# Patient Record
Sex: MALE | Race: BLACK OR AFRICAN AMERICAN | Employment: OTHER | URBAN - METROPOLITAN AREA
[De-identification: names, ages, dates, MRNs, and addresses within clinical notes are randomized per-mention and may not be internally consistent; named-entity substitution may affect disease eponyms.]

---

## 2018-02-25 PROBLEM — R55 NEAR SYNCOPE: Status: ACTIVE | Noted: 2018-02-25

## 2018-02-25 PROBLEM — K92.2 GI BLEEDING: Status: ACTIVE | Noted: 2018-02-25

## 2018-02-25 PROBLEM — K92.2 GASTROINTESTINAL HEMORRHAGE: Status: ACTIVE | Noted: 2018-02-25

## 2018-09-24 ENCOUNTER — HOSPITAL ENCOUNTER (OUTPATIENT)
Dept: LAB | Age: 68
Discharge: HOME | End: 2018-09-24
Payer: MEDICARE

## 2018-09-24 ENCOUNTER — OFFICE VISIT (OUTPATIENT)
Dept: HEMATOLOGY | Age: 68
End: 2018-09-24

## 2018-09-24 VITALS
TEMPERATURE: 99 F | WEIGHT: 131 LBS | OXYGEN SATURATION: 98 % | DIASTOLIC BLOOD PRESSURE: 87 MMHG | HEART RATE: 104 BPM | RESPIRATION RATE: 16 BRPM | BODY MASS INDEX: 18.75 KG/M2 | HEIGHT: 70 IN | SYSTOLIC BLOOD PRESSURE: 143 MMHG

## 2018-09-24 DIAGNOSIS — R94.5 ABNORMAL RESULTS OF LIVER FUNCTION STUDIES: ICD-10-CM

## 2018-09-24 DIAGNOSIS — Z11.59 ENCOUNTER FOR SCREENING FOR OTHER VIRAL DISEASES: ICD-10-CM

## 2018-09-24 DIAGNOSIS — R74.8 ELEVATED LIVER ENZYMES: Primary | ICD-10-CM

## 2018-09-24 DIAGNOSIS — R74.8 ELEVATED LIVER ENZYMES: ICD-10-CM

## 2018-09-24 PROBLEM — Z90.49 S/P CHOLECYSTECTOMY: Status: ACTIVE | Noted: 2018-09-24

## 2018-09-24 PROBLEM — I10 HYPERTENSION: Status: ACTIVE | Noted: 2018-09-24

## 2018-09-24 PROBLEM — R79.89 ELEVATED FERRITIN: Status: ACTIVE | Noted: 2018-09-24

## 2018-09-24 PROBLEM — K92.2 GASTROINTESTINAL HEMORRHAGE: Status: RESOLVED | Noted: 2018-02-25 | Resolved: 2018-09-24

## 2018-09-24 PROBLEM — M10.9 GOUT: Status: ACTIVE | Noted: 2018-09-24

## 2018-09-24 PROBLEM — K57.90 DIVERTICULOSIS: Status: ACTIVE | Noted: 2018-09-24

## 2018-09-24 PROBLEM — R55 NEAR SYNCOPE: Status: RESOLVED | Noted: 2018-02-25 | Resolved: 2018-09-24

## 2018-09-24 LAB
ALBUMIN SERPL-MCNC: 4 G/DL (ref 3.4–5)
ALBUMIN/GLOB SERPL: 0.8 {RATIO} (ref 0.8–1.7)
ALP SERPL-CCNC: 86 U/L (ref 45–117)
ALT SERPL-CCNC: 44 U/L (ref 16–61)
ANION GAP SERPL CALC-SCNC: 10 MMOL/L (ref 3–18)
AST SERPL-CCNC: 120 U/L (ref 15–37)
BASOPHILS # BLD: 0 K/UL (ref 0–0.1)
BASOPHILS NFR BLD: 0 % (ref 0–2)
BILIRUB DIRECT SERPL-MCNC: 0.4 MG/DL (ref 0–0.2)
BILIRUB SERPL-MCNC: 1.2 MG/DL (ref 0.2–1)
BUN SERPL-MCNC: 30 MG/DL (ref 7–18)
BUN/CREAT SERPL: 22 (ref 12–20)
CALCIUM SERPL-MCNC: 9.5 MG/DL (ref 8.5–10.1)
CHLORIDE SERPL-SCNC: 97 MMOL/L (ref 100–108)
CO2 SERPL-SCNC: 29 MMOL/L (ref 21–32)
CREAT SERPL-MCNC: 1.37 MG/DL (ref 0.6–1.3)
DIFFERENTIAL METHOD BLD: ABNORMAL
EOSINOPHIL # BLD: 0 K/UL (ref 0–0.4)
EOSINOPHIL NFR BLD: 1 % (ref 0–5)
ERYTHROCYTE [DISTWIDTH] IN BLOOD BY AUTOMATED COUNT: 12.7 % (ref 11.6–14.5)
FERRITIN SERPL-MCNC: 1124 NG/ML (ref 8–388)
GLOBULIN SER CALC-MCNC: 5.2 G/DL (ref 2–4)
GLUCOSE SERPL-MCNC: 94 MG/DL (ref 74–99)
HCT VFR BLD AUTO: 37.1 % (ref 36–48)
HGB BLD-MCNC: 12.6 G/DL (ref 13–16)
INR PPP: 0.9 (ref 0.8–1.2)
IRON SATN MFR SERPL: 53 %
IRON SERPL-MCNC: 159 UG/DL (ref 50–175)
LYMPHOCYTES # BLD: 1.1 K/UL (ref 0.9–3.6)
LYMPHOCYTES NFR BLD: 20 % (ref 21–52)
MCH RBC QN AUTO: 34.8 PG (ref 24–34)
MCHC RBC AUTO-ENTMCNC: 34 G/DL (ref 31–37)
MCV RBC AUTO: 102.5 FL (ref 74–97)
MONOCYTES # BLD: 0.7 K/UL (ref 0.05–1.2)
MONOCYTES NFR BLD: 12 % (ref 3–10)
NEUTS SEG # BLD: 3.9 K/UL (ref 1.8–8)
NEUTS SEG NFR BLD: 67 % (ref 40–73)
PLATELET # BLD AUTO: 228 K/UL (ref 135–420)
PMV BLD AUTO: 10.3 FL (ref 9.2–11.8)
POTASSIUM SERPL-SCNC: 4.2 MMOL/L (ref 3.5–5.5)
PROT SERPL-MCNC: 9.2 G/DL (ref 6.4–8.2)
PROTHROMBIN TIME: 12.3 SEC (ref 11.5–15.2)
RBC # BLD AUTO: 3.62 M/UL (ref 4.7–5.5)
SODIUM SERPL-SCNC: 136 MMOL/L (ref 136–145)
TIBC SERPL-MCNC: 298 UG/DL (ref 250–450)
WBC # BLD AUTO: 5.8 K/UL (ref 4.6–13.2)

## 2018-09-24 PROCEDURE — 82728 ASSAY OF FERRITIN: CPT | Performed by: INTERNAL MEDICINE

## 2018-09-24 PROCEDURE — 86704 HEP B CORE ANTIBODY TOTAL: CPT | Performed by: INTERNAL MEDICINE

## 2018-09-24 PROCEDURE — 86706 HEP B SURFACE ANTIBODY: CPT | Performed by: INTERNAL MEDICINE

## 2018-09-24 PROCEDURE — 86708 HEPATITIS A ANTIBODY: CPT | Performed by: INTERNAL MEDICINE

## 2018-09-24 PROCEDURE — 83540 ASSAY OF IRON: CPT | Performed by: INTERNAL MEDICINE

## 2018-09-24 PROCEDURE — 82103 ALPHA-1-ANTITRYPSIN TOTAL: CPT | Performed by: INTERNAL MEDICINE

## 2018-09-24 PROCEDURE — 80048 BASIC METABOLIC PNL TOTAL CA: CPT | Performed by: INTERNAL MEDICINE

## 2018-09-24 PROCEDURE — 85610 PROTHROMBIN TIME: CPT | Performed by: INTERNAL MEDICINE

## 2018-09-24 PROCEDURE — 83516 IMMUNOASSAY NONANTIBODY: CPT | Performed by: INTERNAL MEDICINE

## 2018-09-24 PROCEDURE — 86038 ANTINUCLEAR ANTIBODIES: CPT | Performed by: INTERNAL MEDICINE

## 2018-09-24 PROCEDURE — 85025 COMPLETE CBC W/AUTO DIFF WBC: CPT | Performed by: INTERNAL MEDICINE

## 2018-09-24 PROCEDURE — 80076 HEPATIC FUNCTION PANEL: CPT | Performed by: INTERNAL MEDICINE

## 2018-09-24 PROCEDURE — 36415 COLL VENOUS BLD VENIPUNCTURE: CPT | Performed by: INTERNAL MEDICINE

## 2018-09-24 NOTE — PROGRESS NOTES
Raudel Linares is a 77 y.o. male 1. Have you been to the ER, urgent care clinic or hospitalized since your last visit? NO.  
 
2. Have you seen or consulted any other health care providers outside of the [address] [place] [address] since your last visit (Include any pap smears or colon screening)? NO Learning Assessment 9/24/2018 PRIMARY LEARNER Patient BARRIERS PRIMARY LEARNER NONE  
CO-LEARNER CAREGIVER No  
PRIMARY LANGUAGE ENGLISH  
LEARNER PREFERENCE PRIMARY LISTENING  
ANSWERED BY PATIENT  
RELATIONSHIP SELF

## 2018-09-24 NOTE — MR AVS SNAPSHOT
[address] [place] [address] 
[phone] Patient: Micheal Payer MRN: [id number] Xiao Underwood Visit Information Date & Time Provider Department Dept. Phone Encounter #  
 9/24/2018  3:30 PM [facility] [address] [id number] Follow-up Instructions Return in about 4 weeks (around 10/22/2018) for MLS. Upcoming Health Maintenance Date Due Hepatitis C Screening 1950 DTaP/Tdap/Td series (1 - Tdap) 8/14/1969 Shingrix Vaccine Age 50> (1 of 2) 8/14/1998 FOBT Q 1 YEAR AGE 50-75 8/14/1998 GLAUCOMA SCREENING Q2Y 8/14/2013 Pneumococcal 65+ Low/Medium Risk (1 of 2 - PCV13) 8/14/2013 MEDICARE YEARLY EXAM 3/20/2018 Influenza Age 5 to Adult 8/1/2018 Allergies as of 9/24/2018  Review Complete On: 9/24/2018 By: Ady Horn MD  
 No Known Allergies Current Immunizations  Reviewed on 3/1/2018 Name Date Influenza Vaccine 10/19/2016 Not reviewed this visit You Were Diagnosed With   
  
 Codes Comments Elevated liver enzymes    -  Primary ICD-10-CM: R74.8 ICD-9-CM: 790.5 Vitals BP Pulse Temp Resp Height(growth percentile) Weight(growth percentile) 143/87 (BP 1 Location: Right arm, BP Patient Position: Sitting) (!) 104 99 °F (37.2 °C) (Tympanic) 16 5' 10\" (1.778 m) 131 lb (59.4 kg) SpO2 BMI Smoking Status 98% 18.8 kg/m2 Never Smoker BMI and BSA Data Body Mass Index Body Surface Area  
 18.8 kg/m 2 1.71 m 2 Preferred Pharmacy Pharmacy Name Phone [facility] [address] [phone] Your Updated Medication List  
  
   
This list is accurate as of 9/24/18  4:40 PM.  Always use your most recent med list. amLODIPine 10 mg tablet Commonly known as:  Augustine Franco Take 10 mg by mouth daily. BYSTOLIC 20 mg tablet Generic drug:  nebivolol Take 20 mg by mouth daily. colchicine 0.6 mg tablet Take 0.6 mg by mouth daily. DIOVAN 320 mg tablet Generic drug:  valsartan Take 320 mg by mouth daily. multivitamin tablet Commonly known as:  ONE A DAY Take 1 Tab by mouth daily. omeprazole 20 mg capsule Commonly known as:  PRILOSEC Take 2 Caps by mouth daily. thiamine  mg tablet Commonly known as:  B-1 Take 100 mg by mouth daily. Follow-up Instructions Return in about 4 weeks (around 10/22/2018) for MLS. Introducing [facility]! [employer] introduces myVBO patient portal. Now you can access parts of your medical record, email your doctor's office, and request medication refills online. 1. In your internet browser, go to [email or web address] In [email or web address] K[employer] Kopo/Now In Store 2. Click on the First Time User? Click Here link in the Sign In box. You will see the New Member Sign Up page. 3. Enter your myVBO Access Code exactly as it appears below. You will not need to use this code after youve completed the sign-up process. If you do not sign up before the expiration date, you must request a new code. · myVBO Access Code: [email or web address] Expires: 12/23/2018  4:40 PM 
 
4. Enter the last four digits of your Social Security Number (xxxx) and Date of Birth (mm/dd/yyyy) as indicated and click Submit. You will be taken to the next sign-up page. 5. Create a Dynamic Signalt ID. This will be your myVBO login ID and cannot be changed, so think of one that is secure and easy to remember. 6. Create a myVBO password. You can change your password at any time. 7. Enter your Password Reset Question and Answer. This can be used at a later time if you forget your password. 8. Enter your e-mail address. You will receive e-mail notification when new information is available in [address] 9. Click Sign Up. You can now view and download portions of your medical record. 10. Click the Download Summary menu link to download a portable copy of your medical information. If you have questions, please visit the Frequently Asked Questions section of the Marquee website. Remember, Marquee is NOT to be used for urgent needs. For medical emergencies, dial 911. Now available from your iPhone and Android! Please provide this summary of care documentation to your next provider. Your primary care clinician is listed as Celia Putnam. If you have any questions after today's visit, please call [phone].

## 2018-09-24 NOTE — PROGRESS NOTES
[address] [facility] [address] Terry Walton MD, Esperanza Wilson, TRUPTISMATTHEW Avelar, CARINA Mendez, MARIAM Partida, [facility]   Alondra Reyez, CARINA Romero, CARINA Liver [place] [facility] 
  [address] [facility], [address] Terrance Jerez  22. 
  [phone] FAX: [phone]   Liver [facility] 
  [address], [address] [facility], [address] 
  [phone] FAX: [phone] Patient Care Team: 
Corinna Yeboah MD as PCP - [facility]) Ciro Tucker MD (Family Practice) Problem List  Date Reviewed: 9/24/2018 Codes Class Noted Elevated liver enzymes ICD-10-CM: R74.8 ICD-9-CM: 790.5  9/24/2018 Elevated ferritin ICD-10-CM: R79.89 ICD-9-CM: 790.6  9/24/2018 Diverticulosis ICD-10-CM: K57.90 ICD-9-CM: 562.10  9/24/2018 Hypertension ICD-10-CM: I10 
ICD-9-CM: 401.9  9/24/2018 Gout ICD-10-CM: M10.9 ICD-9-CM: 274.9  9/24/2018 S/P cholecystectomy ICD-10-CM: Z90.49 ICD-9-CM: V45.79  9/24/2018 GI bleeding ICD-10-CM: K92.2 ICD-9-CM: 578.9  2/25/2018 The clinicians listed above have asked me to see Nellie Félix in consultation regarding elevated liver enzymes and its management. All medical records sent by the referring physicians were reviewed The patient is a 77 y.o. Black male who was first noted to have abnormalities in liver transaminases in 5/2018. Serologic evaluation for markers of chronic liver disease was negative for HCV, HBV. The Ferritin was markedly elevated. He was hospitalized for lower GI bleeding in 3/2018. THis was thought to be secondary to diverticulosis Imaging of the liver was not performed. An assessment of liver fibrosis with biopsy or elastography has not been performed. The patient had not started any new medications within 3 months preceding the elevation in liver chemistries. The patient has no symptoms which can be attributed to the liver disorder. The patient has not experienced fatigue, pain in the right side over the liver, The patient completes all daily activities without any functional limitations. All of the issues listed in the Assessment and Plan were discussed with the patient. All questions were answered. The patient expressed a clear understanding of the above. [facility] [place] [address] in 4 weeks for Fibroscan to review all data and determine the treatment plan. ASSESSMENT AND PLAN: 
Intermittent elevation in liver transaminases of unclear etiology at this time. AST is elevated. ALT is normal.  ALP is normal.  Liver function is normal.  Total bilirubin is elevated. The platelet count is normal.   
Based upon laboratory studies and imaging  the patient may have significant liver injury. Serologic testing for causes of chronic liver disease were positive for ASMA, Ferritin, FE saturation The most likely causes for the liver chemistry abnormalities were discussed with the patient and include fatty liver disease, immune liver disorders, Will perform laboratory testing to monitor liver function and degree of liver injury. This included BMP, hepatic panel, CBC with platelet count, Will perform imaging of the liver with ultrasound. The need to perform a liver biopsy to help determine the cause and severity of the liver test abnormalities was discussed. The risks of performing the liver biopsy including pain, puncture of the lung, gallbladder, intestine or kidney and bleeding were discussed. Will defer liver biopsy for now. The need to assess liver fibrosis was discussed.   Sheer wave elastography can assess liver fibrosis and determine if a patient has advanced fibrosis or cirrhosis without the need for liver biopsy. Sheer wave elastography is now available at [facility]. This will be scheduled. If elastrography suggests advanced fibrosis then a liver biopsy should be considered. Elevation in Ferritin There is an elevation in ferritin with normal iron saturation. It is unlikely that the patient has hemochromatosis or is a carrier for an HFE gene. Will order HFE genetic testing. The elevation in ferritin may reflect hepatic inflammation. Chronic kidney disease This is probably from of unclear etiology. The serum creatinine appears to be stable. NSAIDs should not be utilized as this may worsen CKD. Treatment of other medical problems in patients with chronic liver disease There are no contraindications for the patient to take any medications that are necessary for treatment of other medical issues. The patient does not consume alcohol daily. Normal doses of acetaminophen can be used for pain as needed. Normal doses of acetaminophen as recommended on the label are not hepatotoxic, even in patients with cirrhosis. Counseling for alcohol in patients with chronic liver disease The patient was counseled regarding alcohol consumption and the effect of alcohol on chronic liver disease. The patient does not consume any significant amount of alcohol. Vaccinations Vaccination for viral hepatitis A is recommended since the patient has no serologic evidence of previous exposure or vaccination with immunity. Vaccination for viral hepatitis B is not needed. The patient has serologic evidence of prior exposure or vaccination with immunity. Routine vaccinations against other bacterial and viral agents can be performed as indicated. Annual flu vaccination should be administered if indicated. Screening [facility]. screening is not necessary if the patient has no evidence of cirrhosis. ALLERGIES No Known Allergies MEDICATIONS Current Outpatient Prescriptions Medication Sig  
 amLODIPine (NORVASC) 10 mg tablet Take 10 mg by mouth daily.  colchicine 0.6 mg tablet Take 0.6 mg by mouth daily.  nebivolol (BYSTOLIC) 20 mg tablet Take 20 mg by mouth daily.  multivitamin (ONE A DAY) tablet Take 1 Tab by mouth daily.  thiamine (B-1) 100 mg tablet Take 100 mg by mouth daily.  valsartan (DIOVAN) 320 mg tablet Take 320 mg by mouth daily.  omeprazole (PRILOSEC) 20 mg capsule Take 2 Caps by mouth daily. No current facility-administered medications for this visit. SYSTEM REVIEW NOT RELATED TO LIVER DISEASE OR REVIEWED ABOVE: 
Constitution systems: Negative for fever, chills, weight gain, weight loss. Eyes: Negative for visual changes. ENT: Negative for sore throat, painful swallowing. Respiratory: Negative for cough, hemoptysis, SOB. Cardiology: Negative for chest pain, palpitations. GI:  Negative for constipation or diarrhea. : Negative for urinary frequency, dysuria, hematuria, nocturia. Skin: Negative for rash. Hematology: Negative for easy bruising, blood clots. Musculo-skelatal: Negative for back pain, muscle pain, weakness. Neurologic: Negative for headaches, dizziness, vertigo, memory problems not related to HE. Psychology: Negative for anxiety, depression. FAMILY HISTORY: 
The patient has no knowledge of the father's medical condition. The mother is  of CVA. There is no family history of liver disease. SOCIAL HISTORY: 
The patient is . The patient has 4 children, and 3 grandchildren. The patient has never used tobacco products. The patient consumes 1-2 alcoholic beverages per day. The patient used to work in [address] [employer] in [place]. The patient retired in . PHYSICAL EXAMINATION: 
Visit Vitals  /87 (BP 1 Location: Right arm, BP Patient Position: Sitting)  Pulse (!) 104  Temp 99 °F (37.2 °C) (Tympanic)  Resp 16  
 Ht 5' 10\" (1.778 m)  Wt 131 lb (59.4 kg)  SpO2 98%  BMI 18.8 kg/m2 General: No acute distress. Eyes: Sclera anicteric. ENT: No oral lesions. Thyroid normal. 
Nodes: No adenopathy. Skin: No spider angiomata. No jaundice. No palmar erythema. Respiratory: Lungs clear to auscultation. Cardiovascular: Regular heart rate. No murmurs. No JVD. Abdomen: Soft non-tender. Liver size normal to percussion/palpation. Spleen not palpable. No obvious ascites. Extremities: No edema. No muscle wasting. No gross arthritic changes. Neurologic: Alert and oriented. Cranial nerves grossly intact. No asterixis. LABORATORY STUDIES: 
Liver [place] of [address] [place] [address] & Units 9/24/2018 WBC 4.6 - 13.2 K/uL 5.8 ANC 1.8 - 8.0 K/UL 3.9 HGB 13.0 - 16.0 g/dL 12.6 (L)  - 420 K/uL 228 INR 0.8 - 1.2   0.9 AST 15 - 37 U/L 120 (H) ALT 16 - 61 U/L 44 Alk Phos 45 - 117 U/L 86 Bili, Total 0.2 - 1.0 MG/DL 1.2 (H) Bili, Direct 0.0 - 0.2 MG/DL 0.4 (H) Albumin 3.4 - 5.0 g/dL 4.0  
BUN 7.0 - 18 MG/DL 30 (H) Creat 0.6 - 1.3 MG/DL 1.37 (H) Na 136 - 145 mmol/L 136  
K 3.5 - 5.5 mmol/L 4.2 Cl 100 - 108 mmol/L 97 (L)  
CO2 21 - 32 mmol/L 29 Glucose 74 - 99 mg/dL 94 SEROLOGIES: 
6/2018. HBsurface antigen negative, anti-HCV negative, Ferritin 1388, FE saturation 49%, DOROTHEA negative, ASMA negative Serologies Latest Ref Rng & Units 9/24/2018 Hep A Ab, Total NEGATIVE   NEGATIVE Hep B Core Ab, Total NEGATIVE   NEGATIVE Hep B Surface Ab >10.0 mIU/mL 417.62 Hep B Surface Ab Interp POS   POSITIVE Ferritin 8 - 388 NG/ML 1124 (H) Iron % Saturation % 53 DOROTHEA, IFA  NEGATIVE  
ASMCA 0 - 19 Units 22 (H) Alpha-1 antitrypsin level 90 - 200 mg/dL 202 (H) LIVER HISTOLOGY: 
Not available or performed ENDOSCOPIC PROCEDURES: 
 Not available or performed RADIOLOGY: 
8/2015. Ultrasound of liver. Echogenic consistent with fatty liver. No liver mass lesions. No dilated bile ducts. No ascites. OTHER TESTING: 
Not available or performed Ryne Kramer MD 
[id number] [address], [address]  Antonioarron Goetz Kylie, [facility] [address] 
[phone]  [address] [place] [address]

## 2018-09-25 LAB
A1AT SERPL-MCNC: 202 MG/DL (ref 90–200)
ACTIN IGG SERPL-ACNC: 22 UNITS (ref 0–19)
ANA TITR SER IF: NEGATIVE {TITER}
HAV AB SER QL IA: NEGATIVE
HBV CORE AB SERPL QL IA: NEGATIVE
HBV SURFACE AB SER QL IA: POSITIVE
HBV SURFACE AB SERPL IA-ACNC: 417.62 MIU/ML
HEP BS AB COMMENT,HBSAC: NORMAL

## 2018-10-29 ENCOUNTER — OFFICE VISIT (OUTPATIENT)
Dept: HEMATOLOGY | Age: 68
End: 2018-10-29

## 2018-10-29 ENCOUNTER — HOSPITAL ENCOUNTER (OUTPATIENT)
Dept: ULTRASOUND IMAGING | Age: 68
Discharge: HOME | End: 2018-10-29
Attending: INTERNAL MEDICINE
Payer: MEDICARE

## 2018-10-29 VITALS
BODY MASS INDEX: 19.58 KG/M2 | HEIGHT: 70 IN | RESPIRATION RATE: 16 BRPM | DIASTOLIC BLOOD PRESSURE: 101 MMHG | HEART RATE: 90 BPM | SYSTOLIC BLOOD PRESSURE: 178 MMHG | OXYGEN SATURATION: 97 % | TEMPERATURE: 98.3 F | WEIGHT: 136.8 LBS

## 2018-10-29 DIAGNOSIS — R74.8 ELEVATED LIVER ENZYMES: Primary | ICD-10-CM

## 2018-10-29 DIAGNOSIS — R74.8 ELEVATED LIVER ENZYMES: ICD-10-CM

## 2018-10-29 PROCEDURE — 0346T US ABD LTD W ELASTOGRAPHY: CPT

## 2018-10-29 NOTE — PROGRESS NOTES
Enedina Love MD, FACP, [place] [place] [place], [place]       Myriam Manzo, MARIAM Pang ACNP-BC   Kendy Goyal, CARINA Siddiqui 75    at [address], [address] [place] [address], [facility] 22.    [phone]    FAX: [email or web address]    at [address], [address]    [phone]    FAX: [phone]         Patient Care Team:  Pablo Vance MD as PCP - General (Family Practice)  Smitha Moreno MD (Family Practice)      Problem List  Date Reviewed: 9/26/2018          Codes Class Noted    Elevated liver enzymes ICD-10-CM: R74.8  ICD-9-CM: 790.5  9/24/2018        Elevated ferritin ICD-10-CM: R79.89  ICD-9-CM: 790.6  9/24/2018        Diverticulosis ICD-10-CM: K57.90  ICD-9-CM: 562.10  9/24/2018        Hypertension ICD-10-CM: I10  ICD-9-CM: 401.9  9/24/2018        Gout ICD-10-CM: M10.9  ICD-9-CM: 274.9  9/24/2018        S/P cholecystectomy ICD-10-CM: Z90.49  ICD-9-CM: V45.79  9/24/2018        GI bleeding ICD-10-CM: K92.2  ICD-9-CM: 578.9  2/25/2018              Alexander Henriquez returns to the [address] for management of elevated liver enzymes and ferritin. The active problem list, all pertinent past medical history, medications, radiologic findings and laboratory findings related to the liver disorder were reviewed with the patient. The patient is a 77 y.o. Black male who was noted to have abnormalities in liver transaminases in 5/2018. Serologic evaluation for markers of chronic liver disease was negative     The most recent imaging of the liver was Ultrasound performed in 10/2018. Results suggest chronic liver disease or cirrhosis. No liver mass lesions noted.     Assessment of liver fibrosis was performed with sheer wave elastogrphy at [facility] in 10/2018. The result was 7.9 kPa which correlates stage 2 septal fibrosis    The patient has no symptoms which can be attributed to the liver disorder. The patient has not experienced fatigue, pain in the right side over the liver,     The patient completes all daily activities without any functional limitations. ASSESSMENT AND PLAN:  Intermittent elevation in liver transaminases of unclear etiology at this time. AST is elevated. ALT is normal.  ALP is normal.  Liver function is normal.  Total bilirubin is elevated. The platelet count is normal.    Based upon laboratory studies and imaging  the patient may have significant liver injury. Serologic testing for causes of chronic liver disease were positive for ASMA, Ferritin, FE saturation    The most likely causes for the liver chemistry abnormalities were discussed with the patient and include fatty liver disease, immune liver disorders,     The need to perform a liver biopsy to help determine the cause and severity of the liver test abnormalities was discussed. The risks of performing the liver biopsy including pain, puncture of the lung, gallbladder, intestine or kidney and bleeding were discussed. Will proceed with liver biopsy. This will be scheduled. Elevation in Ferritin  There is an elevation in ferritin with normal iron saturation. It is unlikely that the patient has hemochromatosis or is a carrier for an HFE gene. Will order HFE genetic testing. The elevation in ferritin may reflect hepatic inflammation. Chronic kidney disease  This is probably from of unclear etiology. The serum creatinine appears to be stable. NSAIDs should not be utilized as this may worsen CKD.       Treatment of other medical problems in patients with chronic liver disease  There are no contraindications for the patient to take any medications that are necessary for treatment of other medical issues. The patient does not consume alcohol daily. Normal doses of acetaminophen can be used for pain as needed. Normal doses of acetaminophen as recommended on the label are not hepatotoxic, even in patients with cirrhosis. Counseling for alcohol in patients with chronic liver disease  The patient was counseled regarding alcohol consumption and the effect of alcohol on chronic liver disease. The patient does not consume any significant amount of alcohol. Vaccinations   Vaccination for viral hepatitis A is recommended since the patient has no serologic evidence of previous exposure or vaccination with immunity. Vaccination for viral hepatitis B is not needed. The patient has serologic evidence of prior exposure or vaccination with immunity. Routine vaccinations against other bacterial and viral agents can be performed as indicated. Annual flu vaccination should be administered if indicated. Screening for Hepatocellular Carcinoma  HCC screening is not necessary if the patient has no evidence of cirrhosis. ALLERGIES  No Known Allergies    MEDICATIONS  Current Outpatient Medications   Medication Sig    omeprazole (PRILOSEC) 20 mg capsule Take 2 Caps by mouth daily.  amLODIPine (NORVASC) 10 mg tablet Take 10 mg by mouth daily.  colchicine 0.6 mg tablet Take 0.6 mg by mouth daily.  nebivolol (BYSTOLIC) 20 mg tablet Take 20 mg by mouth daily.  multivitamin (ONE A DAY) tablet Take 1 Tab by mouth daily.  thiamine (B-1) 100 mg tablet Take 100 mg by mouth daily.  valsartan (DIOVAN) 320 mg tablet Take 320 mg by mouth daily. No current facility-administered medications for this visit. SYSTEM REVIEW NOT RELATED TO LIVER DISEASE OR REVIEWED ABOVE:  Constitution systems: Negative for fever, chills, weight gain, weight loss. Eyes: Negative for visual changes. ENT: Negative for sore throat, painful swallowing. Respiratory: Negative for cough, hemoptysis, SOB. Cardiology: Negative for chest pain, palpitations. GI:  Negative for constipation or diarrhea. : Negative for urinary frequency, dysuria, hematuria, nocturia. Skin: Negative for rash. Hematology: Negative for easy bruising, blood clots. Musculo-skelatal: Negative for back pain, muscle pain, weakness. Neurologic: Negative for headaches, dizziness, vertigo, memory problems not related to HE. Psychology: Negative for anxiety, depression. FAMILY HISTORY:  The patient has no knowledge of the father's medical condition. The mother is  of CVA. There is no family history of liver disease. SOCIAL HISTORY:  The patient is . The patient has 4 children, and 3 grandchildren. The patient has never used tobacco products. The patient consumes 1-2 alcoholic beverages per day. The patient used to work in [employer] in [place]. The patient retired in . PHYSICAL EXAMINATION:  Visit Vitals  BP (!) 178/101 (BP 1 Location: Left arm, BP Patient Position: Sitting)   Pulse 90   Temp 98.3 °F (36.8 °C) (Tympanic)   Resp 16   Ht 5' 10\" (1.778 m)   Wt 136 lb 12.8 oz (62.1 kg)   SpO2 97%   BMI 19.63 kg/m²     General: No acute distress. Eyes: Sclera anicteric. ENT: No oral lesions. Thyroid normal.  Nodes: No adenopathy. Skin: No spider angiomata. No jaundice. No palmar erythema. Respiratory: Lungs clear to auscultation. Cardiovascular: Regular heart rate. No murmurs. No JVD. Abdomen: Soft non-tender. Liver size normal to percussion/palpation. Spleen not palpable. No obvious ascites. Extremities: No edema. No muscle wasting. No gross arthritic changes. Neurologic: Alert and oriented. Cranial nerves grossly intact. No asterixis.     LABORATORY STUDIES:  Liver [place] of [address] Units 2018   WBC 4.6 - 13.2 K/uL 5.8   ANC 1.8 - 8.0 K/UL 3.9   HGB 13.0 - 16.0 g/dL 12.6 (L)    - 420 K/uL 228   INR 0.8 - 1.2   0.9   AST 15 - 37 U/L 120 (H)   ALT 16 - 61 U/L 44   Alk Phos 45 - 117 U/L 86   Bili, Total 0.2 - 1.0 MG/DL 1.2 (H)   Bili, Direct 0.0 - 0.2 MG/DL 0.4 (H)   Albumin 3.4 - 5.0 g/dL 4.0   BUN 7.0 - 18 MG/DL 30 (H)   Creat 0.6 - 1.3 MG/DL 1.37 (H)   Na 136 - 145 mmol/L 136   K 3.5 - 5.5 mmol/L 4.2   Cl 100 - 108 mmol/L 97 (L)   CO2 21 - 32 mmol/L 29   Glucose 74 - 99 mg/dL 94     SEROLOGIES:  6/2018. HBsurface antigen negative, anti-HCV negative, Ferritin 1388, FE saturation 49%, DOROTHEA negative, ASMA negative    Serologies Latest Ref Rng & Units 9/24/2018   Hep A Ab, Total NEGATIVE   NEGATIVE   Hep B Core Ab, Total NEGATIVE   NEGATIVE   Hep B Surface Ab >10.0 mIU/mL 417.62   Hep B Surface Ab Interp POS   POSITIVE   Ferritin 8 - 388 NG/ML 1124 (H)   Iron % Saturation % 53   DOROTHEA, IFA  NEGATIVE   ASMCA 0 - 19 Units 22 (H)   Alpha-1 antitrypsin level 90 - 200 mg/dL 202 (H)     LIVER HISTOLOGY:  10/2018. Sheer wave elastography performed at [facility]. E Range: 6.37-9.48 kPa, E Mean: 7.86 kPa, E Median: 7.94 kPa, E Std: 1.00 kPa. The results suggested a fibrosis level of F2.    ENDOSCOPIC PROCEDURES:  Not available or performed    RADIOLOGY:  8/2015. Ultrasound of liver. Echogenic consistent with fatty liver. No liver mass lesions. No dilated bile ducts. No ascites.     OTHER TESTING:  Not available or performed      Leni Ross MD  [address] [address]  4 [facility] 58, [address]  [address]

## 2018-11-20 ENCOUNTER — TELEPHONE (OUTPATIENT)
Dept: HEMATOLOGY | Age: 68
End: 2018-11-20

## 2018-11-20 NOTE — TELEPHONE ENCOUNTER
11/19/18 - Called pt to inform him that his lbx procedure for 11/28/18 had to be cxld and r/s . Isabella Dangelo No answer    11/20/18 - called pt again to infrom him that his lbx procedure needed to be cxld and r/s . Isabella Dangelo  No answer, left vm to return call

## 2018-11-21 ENCOUNTER — TELEPHONE (OUTPATIENT)
Dept: HEMATOLOGY | Age: 68
End: 2018-11-21

## 2018-11-21 NOTE — TELEPHONE ENCOUNTER
Last attempt to contact pt to r/s procedure for 11/28/18 , Dr Sheldon Werner request , had an opening for 11/26/18 where I could move patient to . Robert Cadena  No answer left vm for pt to return call

## 2018-11-28 ENCOUNTER — HOSPITAL ENCOUNTER (OUTPATIENT)
Age: 68
Setting detail: OUTPATIENT SURGERY
Discharge: HOME | End: 2018-11-28
Attending: INTERNAL MEDICINE | Admitting: INTERNAL MEDICINE
Payer: MEDICARE

## 2018-11-28 ENCOUNTER — APPOINTMENT (OUTPATIENT)
Dept: ULTRASOUND IMAGING | Age: 68
End: 2018-11-28
Attending: INTERNAL MEDICINE
Payer: MEDICARE

## 2018-11-28 VITALS
SYSTOLIC BLOOD PRESSURE: 152 MMHG | HEIGHT: 70 IN | RESPIRATION RATE: 15 BRPM | OXYGEN SATURATION: 97 % | BODY MASS INDEX: 19.35 KG/M2 | HEART RATE: 81 BPM | TEMPERATURE: 98.6 F | DIASTOLIC BLOOD PRESSURE: 87 MMHG | WEIGHT: 135.19 LBS

## 2018-11-28 DIAGNOSIS — R79.89 ELEVATED LFTS: ICD-10-CM

## 2018-11-28 PROCEDURE — 76040000019: Performed by: INTERNAL MEDICINE

## 2018-11-28 PROCEDURE — 77030018836 HC SOL IRR NACL ICUM -A: Performed by: INTERNAL MEDICINE

## 2018-11-28 PROCEDURE — 74011250636 HC RX REV CODE- 250/636: Performed by: INTERNAL MEDICINE

## 2018-11-28 PROCEDURE — 88307 TISSUE EXAM BY PATHOLOGIST: CPT

## 2018-11-28 PROCEDURE — 88313 SPECIAL STAINS GROUP 2: CPT

## 2018-11-28 PROCEDURE — 76942 ECHO GUIDE FOR BIOPSY: CPT

## 2018-11-28 PROCEDURE — 77030013826 HC NDL BIOP MAXCOR BARD -B: Performed by: INTERNAL MEDICINE

## 2018-11-28 RX ORDER — SODIUM CHLORIDE 0.9 % (FLUSH) 0.9 %
5-10 SYRINGE (ML) INJECTION EVERY 8 HOURS
Status: DISCONTINUED | OUTPATIENT
Start: 2018-11-28 | End: 2018-11-28 | Stop reason: HOSPADM

## 2018-11-28 RX ORDER — ONDANSETRON 2 MG/ML
4 INJECTION INTRAMUSCULAR; INTRAVENOUS
Status: CANCELLED | OUTPATIENT
Start: 2018-11-28

## 2018-11-28 RX ORDER — LIDOCAINE HYDROCHLORIDE 10 MG/ML
10 INJECTION INFILTRATION; PERINEURAL ONCE
Status: CANCELLED | OUTPATIENT
Start: 2018-11-28 | End: 2018-11-28

## 2018-11-28 RX ORDER — HYDROMORPHONE HYDROCHLORIDE 1 MG/ML
1 INJECTION, SOLUTION INTRAMUSCULAR; INTRAVENOUS; SUBCUTANEOUS
Status: CANCELLED | OUTPATIENT
Start: 2018-11-28

## 2018-11-28 RX ORDER — SODIUM CHLORIDE 0.9 % (FLUSH) 0.9 %
5-10 SYRINGE (ML) INJECTION AS NEEDED
Status: DISCONTINUED | OUTPATIENT
Start: 2018-11-28 | End: 2018-11-28 | Stop reason: HOSPADM

## 2018-11-28 RX ORDER — HYDROMORPHONE HYDROCHLORIDE 2 MG/ML
1 INJECTION, SOLUTION INTRAMUSCULAR; INTRAVENOUS; SUBCUTANEOUS ONCE
Status: COMPLETED | OUTPATIENT
Start: 2018-11-28 | End: 2018-11-28

## 2018-11-28 RX ORDER — LIDOCAINE HYDROCHLORIDE 10 MG/ML
INJECTION INFILTRATION; PERINEURAL AS NEEDED
Status: DISCONTINUED | OUTPATIENT
Start: 2018-11-28 | End: 2018-11-28 | Stop reason: HOSPADM

## 2018-11-28 RX ADMIN — HYDROMORPHONE HYDROCHLORIDE 1 MG: 2 INJECTION INTRAMUSCULAR; INTRAVENOUS; SUBCUTANEOUS at 09:43

## 2018-11-28 NOTE — PROCEDURES
[address], [place], [place], Sunita Gulshan Gina, [place]       CARINA Null PA-C Edilia Rudd, [facility]CARINA Wakefield NP [address] [facility] 136    at [address], [address] Terrance Jerez  22.    [phone]    FAX: [address] [place] [address], [address]    [phone]    FAX: [phone]         LIVER BIOPSY PROCEDURE NOTE    Corona Michaels  5/60/2084    INDICATIONS/PRE-OPERATIVE  DIAGNOSIS:  Elevated liver enzymes    : Maggy Lutz MD    SEDATION: 1% Lidocaine injection 10 ml    PROCEDURE:  Informed consent to perform the procedure was obtained from the patient. The patient was positioned on the edge of the stretcher lying flat in the supine position. Ultrasound was utilized to image the liver. The diaphragm and any major mass lesion or vascular structures within the liver were identified. An appropriate site for liver biopsy was identified. The distance from the surface of the skin to the liver capsule was 3 cm. This area was prepped with betadine and draped in sterile fashion. The skin was infiltrated with 1% lidocaine. The deeper subcutanous tissues and liver capsule overlying the biopsy site were then infiltrated with 1% lidocaine until appropriate anesthesia was obtained. A small incision was made in the skin so the biopsy devise could be easily inserted. A total of 3 passes with the 18 gauge Bard biopsy devise was then made into the liver. Core(s) of liver tissue totaling 4 cm in length were obtained and placed into tissue fixative. A band aid was placed over the biopsy site.   The patient was then repositioned on the right side and transported to the recovery area on the stretcher for routine monitoring until discharge. The specimen was sent to pathology for processing via the normal transport mechanism. SPECIMEN REMOVED: Liver    ESTIMATED BLOOD LOSS: Negligible.       POST-OPERATIVE DIAGNOSIS: Same as Pre-operative Diagnosis    Darlene Lopez MD  [address]  [address] [place] [address] 58, 300 [facility] [address]  [address] [place] [address]

## 2018-11-28 NOTE — DISCHARGE INSTRUCTIONS
3340 [facility], MD, [address], [facility], [place]       CARINA Brambila PA-C Barrett Dyers, [facility]-BC   Soco Parr, CARINA Longoria [facility] 136    at [address], [address] Terrance Jerez  22.    [phone]    FAX: [address] [place] [address]    [address], [address] [facility], [address]    [phone]    FAX: [phone]         LIVER BIOPSY DISCHARGE INSTRUCTIONS      Clair Quintanilla  [birth year]  Date: 11/28/2018    DIET:    Marilu Langford may resume your previous diet. ACTIVITIES:  Rest quietly the rest of today. You should not lift any objects more than 20 pounds for the next 2 days. If you work sitting down without strenuous activity you may return to work tomorrow. If you exert yourself or do heavy lifting at work you should take tomorrow off. Do not drive or operate hazardous machinery for 12 hours. SPECIAL INSTRUCTIONS:  Do not use any aspirin or non-steroidal (Motin, Advil, Naproxen, etc) pain medications for the next 3 days. You may use extra-strength Tylenol (acetaminophen) if you experience pain or discomfort later today. Call the [address] office if you experience any of the following:  Persistent or severe abdominal pain. Persistent or severe abdominal distention. Fever and chills   Nausea and vomiting. New or unusual symptoms. Follow-up care: You should have a follow up appointment with Dr. Ana Del Real to review the results of the liver biopsy results in 2 weeks. If you do not have an appointment please call the office at the number listed above to schedule this. Other instructions:    If you have any problems or questions call the [facility] office at the phone number listed above.    Donte Aaron from Nurse: The following personal items collected during your admission are returned to you:   Dental Appliance: Dental Appliances: None  Vision:    Hearing Aid:    Jewelry:    Clothing:    Other Valuables:    Valuables sent to safe:       DISCHARGE SUMMARY from Nurse    PATIENT INSTRUCTIONS:    After general anesthesia or intravenous sedation, for 24 hours or while taking prescription Narcotics:  · Limit your activities  · Do not drive and operate hazardous machinery  · Do not make important personal or business decisions  · Do  not drink alcoholic beverages  · If you have not urinated within 8 hours after discharge, please contact your surgeon on call. Report the following to your surgeon:  · Excessive pain, swelling, redness or odor of or around the surgical area  · Temperature over 100.5  · Nausea and vomiting lasting longer than 4 hours or if unable to take medications  · Any signs of decreased circulation or nerve impairment to extremity: change in color, persistent  numbness, tingling, coldness or increase pain  · Any questions    *  Please give a list of your current medications to your Primary Care Provider. *  Please update this list whenever your medications are discontinued, doses are      changed, or new medications (including over-the-counter products) are added. *  Please carry medication information at all times in case of emergency situations. These are general instructions for a healthy lifestyle:    No smoking/ No tobacco products/ Avoid exposure to second hand smoke  Surgeon General's Warning:  Quitting smoking now greatly reduces serious risk to your health.     Obesity, smoking, and sedentary lifestyle greatly increases your risk for illness    A healthy diet, regular physical exercise & weight monitoring are important for maintaining a healthy lifestyle    You may be retaining fluid if you have a history of heart failure or if you experience any of the following symptoms:  Weight gain of 3 pounds or more overnight or 5 pounds in a week, increased swelling in our hands or feet or shortness of breath while lying flat in bed. Please call your doctor as soon as you notice any of these symptoms; do not wait until your next office visit. Recognize signs and symptoms of STROKE:    F-face looks uneven    A-arms unable to move or move unevenly    S-speech slurred or non-existent    T-time-call 911 as soon as signs and symptoms begin-DO NOT go       Back to bed or wait to see if you get better-TIME IS BRAIN. Warning Signs of HEART ATTACK     Call 911 if you have these symptoms:   Chest discomfort. Most heart attacks involve discomfort in the center of the chest that lasts more than a few minutes, or that goes away and comes back. It can feel like uncomfortable pressure, squeezing, fullness, or pain.  Discomfort in other areas of the upper body. Symptoms can include pain or discomfort in one or both arms, the back, neck, jaw, or stomach.  Shortness of breath with or without chest discomfort.  Other signs may include breaking out in a cold sweat, nausea, or lightheadedness. Don't wait more than five minutes to call 911 - MINUTES MATTER! Fast action can save your life. Calling 911 is almost always the fastest way to get lifesaving treatment. Emergency Medical Services staff can begin treatment when they arrive -- up to an hour sooner than if someone gets to the hospital by car. The discharge information has been reviewed with the patient and caregiver. The patient and caregiver verbalized understanding. Discharge medications reviewed with the patient and caregiver and appropriate educational materials and side effects teaching were provided. Patient armband removed and shredded  ___________________________________________________________________________________________________________________________________

## 2018-11-28 NOTE — H&P
[address] Emmy Sonia August MD, Maty Stewart, TRUPTISLD Cedrick Underwood, MARIAM Mark, [facility]-BC   Luiz Gonzalez, CARINA Huerta, CARINA Dorantes [facility] 136 
  at [address], [address] Terrance Jerez  22. 
  [phone] FAX: [address] 
  [facility] 
  [address], [address] [facility], [address] 
  [phone] FAX: [phone] PRE-PROCEDURE NOTE - LIVER BIOPSY H and P from last office visit reviewed. Allergies reviewed. Out-patient medication list reviewed. Patient Active Problem List  
Diagnosis Code  GI bleeding K92.2  Elevated liver enzymes R74.8  Elevated ferritin R79.89  Diverticulosis K57.90  Hypertension I10  
 Gout M10.9  S/P cholecystectomy Z90.49 No Known Allergies No current facility-administered medications on file prior to encounter. Current Outpatient Medications on File Prior to Encounter Medication Sig Dispense Refill  omeprazole (PRILOSEC) 20 mg capsule Take 2 Caps by mouth daily. (Patient taking differently: Take 20 mg by mouth daily as needed.) 30 Cap 0  
 amLODIPine (NORVASC) 10 mg tablet Take 10 mg by mouth daily.  colchicine 0.6 mg tablet Take 0.6 mg by mouth as needed.  nebivolol (BYSTOLIC) 20 mg tablet Take 20 mg by mouth daily.  valsartan (DIOVAN) 320 mg tablet Take 320 mg by mouth daily.  multivitamin (ONE A DAY) tablet Take 1 Tab by mouth daily.  thiamine (B-1) 100 mg tablet Take 100 mg by mouth daily. For liver biopsy to assess Abnormal liver enzymes. The risks of the procedure were discussed with the patient. This included bleeding, pain, and puncture of other organs.   All questions were answered. The patient wishes to proceed with the procedure. PHYSICAL EXAMINATION: 
VS: per nursing note General: No acute distress. Eyes: Sclera anicteric. ENT: No oral lesions. Thyroid normal. 
Nodes: No adenopathy. Skin: No spider angiomata. No jaundice. No palmar erythema. Respiratory: Lungs clear to auscultation. Cardiovascular: Regular heart rate. No murmurs. No JVD. Abdomen: Soft non-tender, liver size normal to percussion/palpation. Spleen not palpable. No obvious ascites. Extremities: No edema. No muscle wasting. No gross arthritic changes. Neurologic: Alert and oriented. Cranial nerves grossly intact. No asterixis. LABS: 
Lab Results Component Value Date/Time WBC 5.8 09/24/2018 04:40 PM  
 HGB 12.6 (L) 09/24/2018 04:40 PM  
 HCT 37.1 09/24/2018 04:40 PM  
 PLATELET 375 13/99/2520 04:40 PM  
 .5 (H) 09/24/2018 04:40 PM  
 
Lab Results Component Value Date/Time INR 0.9 09/24/2018 04:40 PM  
 INR 1.2 (H) 02/25/2018 05:54 AM  
 Prothrombin time 12.3 09/24/2018 04:40 PM  
 Prothrombin time 13.1 (H) 02/25/2018 05:54 AM  
 
 
ASSESSMENT AND PLAN: 
Liver biopsy under ultrasound guidance. Raymond Card MD 
[address] [place] [address], [address] [place], [place] [address]arron Zepeda, [facility] [address] 
[phone] [address] [place] [address]

## 2018-12-12 ENCOUNTER — OFFICE VISIT (OUTPATIENT)
Dept: HEMATOLOGY | Age: 68
End: 2018-12-12

## 2018-12-12 VITALS
TEMPERATURE: 98.1 F | OXYGEN SATURATION: 96 % | SYSTOLIC BLOOD PRESSURE: 154 MMHG | BODY MASS INDEX: 19.27 KG/M2 | HEART RATE: 79 BPM | RESPIRATION RATE: 16 BRPM | WEIGHT: 134.6 LBS | DIASTOLIC BLOOD PRESSURE: 98 MMHG | HEIGHT: 70 IN

## 2018-12-12 DIAGNOSIS — R74.8 ELEVATED LIVER ENZYMES: Primary | ICD-10-CM

## 2018-12-12 NOTE — PROGRESS NOTES
Enedina Love MD, FACP, [place] Gulshan Peña, [place]       Jan Hernandez, CARINA Donaldson, MARIAM Gomes, ACNP-BC   Conner Ayala, CARINA Gomez 75    at [address], [address] [place] [address], [facility] 22.    [phone]    FAX: [email or web address]    at [address], [address]    [phone]    FAX: [phone]         Patient Care Team:  Mary Concepcion MD as PCP - General (Family Practice)  Tobe Frankel, MD (Family Practice)      Problem List  Date Reviewed: 11/28/2018          Codes Class Noted    Elevated liver enzymes ICD-10-CM: R74.8  ICD-9-CM: 790.5  9/24/2018        Elevated ferritin ICD-10-CM: R79.89  ICD-9-CM: 790.6  9/24/2018        Diverticulosis ICD-10-CM: K57.90  ICD-9-CM: 562.10  9/24/2018        Hypertension ICD-10-CM: I10  ICD-9-CM: 401.9  9/24/2018        Gout ICD-10-CM: M10.9  ICD-9-CM: 274.9  9/24/2018        S/P cholecystectomy ICD-10-CM: Z90.49  ICD-9-CM: V45.79  9/24/2018        GI bleeding ICD-10-CM: K92.2  ICD-9-CM: 578.9  2/25/2018              Ronna Rivas returns to the [address] for management of elevated liver enzymes and ferritin. The active problem list, all pertinent past medical history, medications, radiologic findings and laboratory findings related to the liver disorder were reviewed with the patient. The patient is a 77 y.o. Black male who was noted to have abnormalities in liver transaminases in 5/2018. Serologic evaluation for markers of chronic liver disease was negative     The most recent imaging of the liver was Ultrasound performed in 10/2018. Results suggest chronic liver disease or cirrhosis. No liver mass lesions noted.     Assessment of liver fibrosis was performed with sheer wave elastogrphy at THE [facility] in 10/2018. The result was 7.9 kPa which correlates stage 2 septal fibrosis    The patient underwent a liver biopsy in 11/2018. The procedure was well tolerated. I have personally reviewed the liver biopsy slides. This demonstrates steatohepatitis that has the appearance of alcohol (VILMA) as opposed to DOWELL    The patient has no symptoms which can be attributed to the liver disorder. The patient has not experienced fatigue, pain in the right side over the liver,     The patient completes all daily activities without any functional limitations. ASSESSMENT AND PLAN:  VILMA  The diagnosis is based upon liver biopsy showing macrovesicular steatosis, and a lot of ballooning and Alicia bodies without a lot of inflammation. There is also no features of metabolic syndrome,     AST is elevated. ALT is normal.  ALP is normal.  Liver function is normal.  The platelet count is normal.      Serologic testing for causes of chronic liver disease were negative     The need to assess liver fibrosis was discussed. Sheer wave elastography can assess liver fibrosis and determine if a patient has advanced fibrosis or cirrhosis without the need for liver biopsy. Sheer wave elastography is now available at [facility]. This will be scheduled. Elastography can be repeated annually to demonstrate that hepatic fibrosis is resolving with abstinence. Elevation in Ferritin  There is an elevation in ferritin with normal iron saturation. It is unlikely that the patient has hemochromatosis or is a carrier for an HFE gene. The elevation in ferritin reflects alcohol    Chronic kidney disease  This is probably from of unclear etiology. The serum creatinine appears to be stable. NSAIDs should not be utilized as this may worsen CKD.       Screening for Hepatocellular Carcinoma  HCC screening is not necessary if the patient has no evidence of cirrhosis. Treatment of other medical problems in patients with chronic liver disease  There are no contraindications for the patient to take any medications that are necessary for treatment of other medical issues. The patient does not consume alcohol daily. Normal doses of acetaminophen can be used for pain as needed. Normal doses of acetaminophen as recommended on the label are not hepatotoxic, even in patients with cirrhosis. Counseling for alcohol in patients with chronic liver disease  The patient was counseled regarding alcohol consumption and the effect of alcohol on chronic liver disease. The patient calims to only consume 1-2 alcoholic drinks per day  However, records in the [facility] record suggest he consumes much more than that, at least in the past.    Vaccinations   Vaccination for viral hepatitis A is recommended since the patient has no serologic evidence of previous exposure or vaccination with immunity. Vaccination for viral hepatitis B is not needed. The patient has serologic evidence of prior exposure or vaccination with immunity. Routine vaccinations against other bacterial and viral agents can be performed as indicated. Annual flu vaccination should be administered if indicated. ALLERGIES  No Known Allergies    MEDICATIONS  Current Outpatient Medications   Medication Sig    omeprazole (PRILOSEC) 20 mg capsule Take 2 Caps by mouth daily. (Patient taking differently: Take 20 mg by mouth daily as needed.)    amLODIPine (NORVASC) 10 mg tablet Take 10 mg by mouth daily.  colchicine 0.6 mg tablet Take 0.6 mg by mouth as needed.  nebivolol (BYSTOLIC) 20 mg tablet Take 20 mg by mouth daily.  multivitamin (ONE A DAY) tablet Take 1 Tab by mouth daily.  thiamine (B-1) 100 mg tablet Take 100 mg by mouth daily.  valsartan (DIOVAN) 320 mg tablet Take 320 mg by mouth daily. No current facility-administered medications for this visit.         SYSTEM REVIEW NOT RELATED TO LIVER DISEASE OR REVIEWED ABOVE:  Constitution systems: Negative for fever, chills, weight gain, weight loss. Eyes: Negative for visual changes. ENT: Negative for sore throat, painful swallowing. Respiratory: Negative for cough, hemoptysis, SOB. Cardiology: Negative for chest pain, palpitations. GI:  Negative for constipation or diarrhea. : Negative for urinary frequency, dysuria, hematuria, nocturia. Skin: Negative for rash. Hematology: Negative for easy bruising, blood clots. Musculo-skelatal: Negative for back pain, muscle pain, weakness. Neurologic: Negative for headaches, dizziness, vertigo, memory problems not related to HE. Psychology: Negative for anxiety, depression. FAMILY HISTORY:  The patient has no knowledge of the father's medical condition. The mother is  of CVA. There is no family history of liver disease. SOCIAL HISTORY:  The patient is . The patient has 4 children, and 3 grandchildren. The patient has never used tobacco products. The patient consumes 1-2 alcoholic beverages per day. The patient used to work in [employer] in [place]. The patient retired in . PHYSICAL EXAMINATION:  Visit Vitals  BP (!) 154/98 (BP 1 Location: Left arm, BP Patient Position: Sitting)   Pulse 79   Temp 98.1 °F (36.7 °C) (Tympanic)   Resp 16   Ht 5' 10\" (1.778 m)   Wt 134 lb 9.6 oz (61.1 kg)   SpO2 96%   BMI 19.31 kg/m²     General: No acute distress. Eyes: Sclera anicteric. ENT: No oral lesions. Thyroid normal.  Nodes: No adenopathy. Skin: No spider angiomata. No jaundice. No palmar erythema. Respiratory: Lungs clear to auscultation. Cardiovascular: Regular heart rate. No murmurs. No JVD. Abdomen: Soft non-tender. Liver size normal to percussion/palpation. Spleen not palpable. No obvious ascites. Extremities: No edema. No muscle wasting. No gross arthritic changes. Neurologic: Alert and oriented.   Cranial nerves grossly intact. No asterixis. LABORATORY STUDIES:  Liver [place] of [address] Units 9/24/2018   WBC 4.6 - 13.2 K/uL 5.8   ANC 1.8 - 8.0 K/UL 3.9   HGB 13.0 - 16.0 g/dL 12.6 (L)    - 420 K/uL 228   INR 0.8 - 1.2   0.9   AST 15 - 37 U/L 120 (H)   ALT 16 - 61 U/L 44   Alk Phos 45 - 117 U/L 86   Bili, Total 0.2 - 1.0 MG/DL 1.2 (H)   Bili, Direct 0.0 - 0.2 MG/DL 0.4 (H)   Albumin 3.4 - 5.0 g/dL 4.0   BUN 7.0 - 18 MG/DL 30 (H)   Creat 0.6 - 1.3 MG/DL 1.37 (H)   Na 136 - 145 mmol/L 136   K 3.5 - 5.5 mmol/L 4.2   Cl 100 - 108 mmol/L 97 (L)   CO2 21 - 32 mmol/L 29   Glucose 74 - 99 mg/dL 94     SEROLOGIES:  6/2018. HBsurface antigen negative, anti-HCV negative, Ferritin 1388, FE saturation 49%, DOROTHEA negative, ASMA negative    Serologies Latest Ref Rng & Units 9/24/2018   Hep A Ab, Total NEGATIVE   NEGATIVE   Hep B Core Ab, Total NEGATIVE   NEGATIVE   Hep B Surface Ab >10.0 mIU/mL 417.62   Hep B Surface Ab Interp POS   POSITIVE   Ferritin 8 - 388 NG/ML 1124 (H)   Iron % Saturation % 53   DOROTHEA, IFA  NEGATIVE   ASMCA 0 - 19 Units 22 (H)   Alpha-1 antitrypsin level 90 - 200 mg/dL 202 (H)     LIVER HISTOLOGY:  10/2018. Sheer wave elastography performed at THE [facility]. E Range: 6.37-9.48 kPa, E Mean: 7.86 kPa, E Median: 7.94 kPa, E Std: 1.00 kPa. The results suggested a fibrosis level of F2.    ENDOSCOPIC PROCEDURES:  Not available or performed    RADIOLOGY:  8/2015. Ultrasound of liver. Echogenic consistent with fatty liver. No liver mass lesions. No dilated bile ducts. No ascites. OTHER TESTING:  Not available or performed    FOLLOW-UP:  All of the issues listed above in the Assessment and Plan were discussed with the patient. All questions were answered. The patient expressed a clear understanding of the above. [address] [place] [address] in 3 months to review all data and determine the treatment plan.       Gabrielle Oliveira MD  [address],[facility]  [address] [facility] Angelique CHAUDHARI [address]  12 [facility]

## 2019-05-31 ENCOUNTER — IMPORTED ENCOUNTER (OUTPATIENT)
Dept: URBAN - NONMETROPOLITAN AREA CLINIC 1 | Facility: CLINIC | Age: 69
End: 2019-05-31

## 2019-05-31 PROBLEM — H20.021: Noted: 2020-05-29

## 2019-05-31 PROBLEM — Z96.1: Noted: 2018-11-12

## 2019-05-31 PROBLEM — H26.493: Noted: 2018-11-12

## 2019-05-31 PROBLEM — H40.013: Noted: 2019-05-31

## 2019-05-31 PROCEDURE — 92014 COMPRE OPH EXAM EST PT 1/>: CPT

## 2019-05-31 NOTE — PATIENT DISCUSSION
s/p PC IOL stand/trad OS 07/03/2018 OD 06/19/2018s/p PCIOL-Stable PCIOL s/p YAG Caps OS PCO OS-Monitor. Glaucoma Suspect-Based on C:D-Appears stable at this time.-Continue to monitor with exams and testing.-Order VF in 6 months then follow up; 's Notes: [address] 1/8/18VJIMMIE- 1/8/18

## 2019-11-18 ENCOUNTER — IMPORTED ENCOUNTER (OUTPATIENT)
Dept: URBAN - NONMETROPOLITAN AREA CLINIC 1 | Facility: CLINIC | Age: 69
End: 2019-11-18

## 2019-11-18 PROCEDURE — 92083 EXTENDED VISUAL FIELD XM: CPT

## 2020-05-29 ENCOUNTER — IMPORTED ENCOUNTER (OUTPATIENT)
Dept: URBAN - NONMETROPOLITAN AREA CLINIC 1 | Facility: CLINIC | Age: 70
End: 2020-05-29

## 2020-05-29 PROCEDURE — 92012 INTRM OPH EXAM EST PATIENT: CPT

## 2020-05-29 NOTE — PATIENT DISCUSSION
recurrent iritis odlow gradeeducate ptstart pf qid od x 1wk then stop; Dr's Notes: [address] 1/8/18VJIMMIE- 1/8/18

## 2021-06-25 ENCOUNTER — IMPORTED ENCOUNTER (OUTPATIENT)
Dept: URBAN - NONMETROPOLITAN AREA CLINIC 1 | Facility: CLINIC | Age: 71
End: 2021-06-25

## 2021-06-25 PROBLEM — H40.013: Noted: 2021-06-25

## 2021-06-25 PROBLEM — Z96.1: Noted: 2021-06-25

## 2021-06-25 PROCEDURE — 92014 COMPRE OPH EXAM EST PT 1/>: CPT

## 2021-06-25 NOTE — PATIENT DISCUSSION
s/p PCIOL-Stable PCIOL OU.-Monitor for PCO. -RTC . Early PCO OS-Explained symptoms of advancing PCO. -Continue to monitor for now. Pt will notify us if any new symptoms develop. Glaucoma Suspect-Based on CUPPING-Appears stable at this time.-Continue to monitor with exams and testing.; 's Notes: Misael Oseguera 72 OCT- 1/8/18VF- 1/8/18

## 2022-03-18 PROBLEM — K57.90 DIVERTICULOSIS: Status: ACTIVE | Noted: 2018-09-24

## 2022-03-18 PROBLEM — K92.2 GI BLEEDING: Status: ACTIVE | Noted: 2018-02-25

## 2022-03-19 PROBLEM — R79.89 ELEVATED FERRITIN: Status: ACTIVE | Noted: 2018-09-24

## 2022-03-19 PROBLEM — Z90.49 S/P CHOLECYSTECTOMY: Status: ACTIVE | Noted: 2018-09-24

## 2022-03-19 PROBLEM — I10 HYPERTENSION: Status: ACTIVE | Noted: 2018-09-24

## 2022-03-19 PROBLEM — R74.8 ELEVATED LIVER ENZYMES: Status: ACTIVE | Noted: 2018-09-24

## 2022-03-20 PROBLEM — M10.9 GOUT: Status: ACTIVE | Noted: 2018-09-24

## 2022-04-09 ASSESSMENT — VISUAL ACUITY
OS_SC: J1
OS_CC: 20/25
OD_CC: 20/40
OS_GLARE: 20/30
OD_CC: J1
OS_CC: 20/20
OD_GLARE: 20/30
OD_SC: J1
OD_CC: 20/25
OD_CC: 20/25
OS_CC: 20/20
OS_CC: J1
OU_CC: 20/20

## 2022-04-09 ASSESSMENT — TONOMETRY
OS_IOP_MMHG: 16
OS_IOP_MMHG: 15
OD_IOP_MMHG: 16
OS_IOP_MMHG: 18
OD_IOP_MMHG: 17
OD_IOP_MMHG: 14

## 2022-06-27 ENCOUNTER — COMPREHENSIVE EXAM (OUTPATIENT)
Dept: RURAL CLINIC 1 | Facility: CLINIC | Age: 72
End: 2022-06-27

## 2022-06-27 DIAGNOSIS — Z96.1: ICD-10-CM

## 2022-06-27 DIAGNOSIS — H40.013: ICD-10-CM

## 2022-06-27 PROCEDURE — 92083 EXTENDED VISUAL FIELD XM: CPT

## 2022-06-27 PROCEDURE — 92014 COMPRE OPH EXAM EST PT 1/>: CPT

## 2022-06-27 ASSESSMENT — TONOMETRY
OS_IOP_MMHG: 17
OD_IOP_MMHG: 17

## 2022-06-27 ASSESSMENT — VISUAL ACUITY
OD_SC: 20/25
OS_SC: 20/30

## 2022-06-27 NOTE — PATIENT DISCUSSION
s/p PCIOL-Stable PCIOL OU.-Monitor for PCO. -RTC . Early PCO OS-Explained symptoms of advancing PCO. -Continue to monitor for now. Pt will notify us if any new symptoms develop. Glaucoma Suspect-Based on CUPPING-Appears stable at this time.-Continue to monitor with exams and testing.; 's Notes: Misael Oseguera 72 OCT- 1/8/18VF- 1/8/18.

## 2023-05-18 RX ORDER — FERROUS SULFATE 325(65) MG
325 TABLET ORAL
COMMUNITY

## 2023-05-18 RX ORDER — NEBIVOLOL 20 MG/1
20 TABLET ORAL DAILY
COMMUNITY

## 2023-05-18 RX ORDER — ALLOPURINOL 100 MG/1
100 TABLET ORAL 2 TIMES DAILY
COMMUNITY

## 2023-05-18 RX ORDER — AMLODIPINE BESYLATE 10 MG/1
10 TABLET ORAL DAILY
COMMUNITY

## 2023-05-18 RX ORDER — COLCHICINE 0.6 MG/1
0.6 TABLET ORAL PRN
COMMUNITY

## 2024-03-21 ENCOUNTER — ESTABLISHED PATIENT (OUTPATIENT)
Dept: RURAL CLINIC 1 | Facility: CLINIC | Age: 74
End: 2024-03-21

## 2024-03-21 DIAGNOSIS — Z96.1: ICD-10-CM

## 2024-03-21 DIAGNOSIS — H26.492: ICD-10-CM

## 2024-03-21 DIAGNOSIS — H40.013: ICD-10-CM

## 2024-03-21 PROCEDURE — 92014 COMPRE OPH EXAM EST PT 1/>: CPT

## 2024-03-21 PROCEDURE — 92083 EXTENDED VISUAL FIELD XM: CPT

## 2024-03-21 ASSESSMENT — TONOMETRY
OS_IOP_MMHG: 15
OD_IOP_MMHG: 15

## 2024-03-21 ASSESSMENT — VISUAL ACUITY
OU_CC: 20/20
OD_CC: 20/20
OS_CC: 20/20

## 2025-06-23 ENCOUNTER — FOLLOW UP (OUTPATIENT)
Age: 75
End: 2025-06-23

## 2025-06-23 DIAGNOSIS — H40.013: ICD-10-CM

## 2025-06-23 DIAGNOSIS — Z96.1: ICD-10-CM

## 2025-06-23 DIAGNOSIS — H26.492: ICD-10-CM

## 2025-06-23 PROCEDURE — 92014 COMPRE OPH EXAM EST PT 1/>: CPT

## 2025-06-23 PROCEDURE — 92083 EXTENDED VISUAL FIELD XM: CPT

## (undated) DEVICE — SKIN MARKER,REGULAR TIP WITH RULER AND LABELS: Brand: DEVON

## (undated) DEVICE — TRAY PREP DRY W/ PREM GLV 2 APPL 6 SPNG 2 UNDPD 1 OVERWRAP

## (undated) DEVICE — MAJ-1414 SINGLE USE ADPATER BIOPSY VALV: Brand: SINGLE USE ADAPTOR BIOPSY VALVE

## (undated) DEVICE — SPONGE GZ W4XL4IN COT 12 PLY TYP VII WVN C FLD DSGN

## (undated) DEVICE — (D)BNDG ADHESIVE FABRIC 3/4X3 -- DISC BY MFR USE ITEM 357960

## (undated) DEVICE — HYPODERMIC SAFETY NEEDLE: Brand: MAGELLAN

## (undated) DEVICE — SYR 10ML LUER LOK 1/5ML GRAD --

## (undated) DEVICE — KENDALL RADIOLUCENT FOAM MONITORING ELECTRODE RECTANGULAR SHAPE: Brand: KENDALL

## (undated) DEVICE — PAD NON-ADHERENT 3X4 STRL LF --

## (undated) DEVICE — MAYO STAND COVER: Brand: CONVERTORS

## (undated) DEVICE — MAX-CORE® DISPOSABLE CORE BIOPSY INSTRUMENT, 16G X 16CM: Brand: MAX-CORE

## (undated) DEVICE — SOL IRRIGATION INJ NACL 0.9% 500ML BTL

## (undated) DEVICE — INTENDED FOR TISSUE SEPARATION, AND OTHER PROCEDURES THAT REQUIRE A SHARP SURGICAL BLADE TO PUNCTURE OR CUT.: Brand: BARD-PARKER ®  SAFETY SCALPED

## (undated) DEVICE — DRAPE,APERTURE,MINOR PROCEDURE: Brand: MEDLINE